# Patient Record
Sex: MALE
[De-identification: names, ages, dates, MRNs, and addresses within clinical notes are randomized per-mention and may not be internally consistent; named-entity substitution may affect disease eponyms.]

---

## 2021-03-02 ENCOUNTER — NURSE TRIAGE (OUTPATIENT)
Dept: OTHER | Facility: CLINIC | Age: 46
End: 2021-03-02

## 2021-03-02 NOTE — TELEPHONE ENCOUNTER
Reason for Disposition   [1] Tearing or blinking AND [2] persists > 1 hour since irrigation  (regardless of duration of flushing)    Answer Assessment - Initial Assessment Questions  1. TYPE OF FOREIGN BODY: \"What got in the eye? \"       Banding    2. ONSET: \"When did it happen? \"       Today    3. MECHANISM: \"How did it happen?\"         4. VISION: \"Do you have blurred vision? \"       Denies    5. PAIN: \"Is it painful? \" If so, ask: \"How bad is the pain? \"  (Scale 1-10; or mild, moderate, severe)      Yes, 1/10    6. CONTACTS: \"Do you wear contacts? \"      Denies    7. OTHER SYMPTOMS: \"Do you have any other symptoms? \"      Watering form eye, sensitive to light    8. PREGNANCY: \"Is there any chance you are pregnant? \" \"When was your last menstrual period? \"      NA    Protocols used: EYE - FOREIGN BODY-ADULT-    Brief description of triage: see above    Triage indicates for patient to go to ED now    Care advice provided, patient verbalizes understanding; denies any other questions or concerns; instructed to call back for any new or worsening symptoms. This triage is a result of a call to Vidya gomez Nurse. Please do not respond to the triage nurse through this encounter. Any subsequent communication should be directly with the patient.